# Patient Record
Sex: MALE | Race: OTHER | HISPANIC OR LATINO
[De-identification: names, ages, dates, MRNs, and addresses within clinical notes are randomized per-mention and may not be internally consistent; named-entity substitution may affect disease eponyms.]

---

## 2019-05-28 ENCOUNTER — APPOINTMENT (OUTPATIENT)
Dept: SPORTS MEDICINE | Facility: CLINIC | Age: 54
End: 2019-05-28

## 2020-12-18 ENCOUNTER — TRANSCRIPTION ENCOUNTER (OUTPATIENT)
Age: 55
End: 2020-12-18

## 2021-01-15 ENCOUNTER — APPOINTMENT (OUTPATIENT)
Dept: DISASTER EMERGENCY | Facility: CLINIC | Age: 56
End: 2021-01-15

## 2021-07-24 ENCOUNTER — TRANSCRIPTION ENCOUNTER (OUTPATIENT)
Age: 56
End: 2021-07-24

## 2021-08-10 ENCOUNTER — APPOINTMENT (OUTPATIENT)
Dept: UROLOGY | Facility: CLINIC | Age: 56
End: 2021-08-10
Payer: MEDICAID

## 2021-08-10 ENCOUNTER — APPOINTMENT (OUTPATIENT)
Dept: UROLOGY | Facility: CLINIC | Age: 56
End: 2021-08-10

## 2021-08-10 VITALS
HEART RATE: 76 BPM | BODY MASS INDEX: 28.19 KG/M2 | DIASTOLIC BLOOD PRESSURE: 74 MMHG | SYSTOLIC BLOOD PRESSURE: 112 MMHG | OXYGEN SATURATION: 96 % | WEIGHT: 186 LBS | HEIGHT: 68 IN | RESPIRATION RATE: 18 BRPM

## 2021-08-10 DIAGNOSIS — E78.00 PURE HYPERCHOLESTEROLEMIA, UNSPECIFIED: ICD-10-CM

## 2021-08-10 DIAGNOSIS — Z01.818 ENCOUNTER FOR OTHER PREPROCEDURAL EXAMINATION: ICD-10-CM

## 2021-08-10 DIAGNOSIS — R53.83 OTHER FATIGUE: ICD-10-CM

## 2021-08-10 PROCEDURE — 99204 OFFICE O/P NEW MOD 45 MIN: CPT

## 2021-08-10 PROCEDURE — 36415 COLL VENOUS BLD VENIPUNCTURE: CPT

## 2021-08-10 NOTE — PHYSICAL EXAM
[Normal Appearance] : normal appearance [Edema] : no peripheral edema [Respiration, Rhythm And Depth] : normal respiratory rhythm and effort [Normal Station and Gait] : the gait and station were normal for the patient's age [Skin Color & Pigmentation] : normal skin color and pigmentation [Sensation] : the sensory exam was normal to light touch and pinprick [Oriented To Time, Place, And Person] : oriented to person, place, and time [Inguinal Lymph Nodes Enlarged Bilaterally] : inguinal [Urethral Meatus] : meatus normal [Penis Abnormality] : normal uncircumcised penis [Urinary Bladder Findings] : the bladder was normal on palpation [Epididymis] : the epididymides were normal [FreeTextEntry1] : bilateral grade II variocele; L testicular atrophy (L testicle smaller than R testicle)

## 2021-08-10 NOTE — HISTORY OF PRESENT ILLNESS
[FreeTextEntry1] : ARIK CANCHOLA, a 55 year old male, presented to the office with the chief complaint of fatigue and low Testosterone.\par \par Patient went to PCP and bloods were drawn. He was told he low testosterone.\par \par Morning erections absent\par \par Stated 3 weeks ago.\par \par No urinary issues

## 2021-08-11 LAB
ALBUMIN SERPL ELPH-MCNC: 4.5 G/DL
ALP BLD-CCNC: 62 U/L
ALT SERPL-CCNC: 30 U/L
ANION GAP SERPL CALC-SCNC: 10 MMOL/L
AST SERPL-CCNC: 23 U/L
BASOPHILS # BLD AUTO: 0.03 K/UL
BASOPHILS NFR BLD AUTO: 0.4 %
BILIRUB SERPL-MCNC: 0.6 MG/DL
BUN SERPL-MCNC: 18 MG/DL
CALCIUM SERPL-MCNC: 9.5 MG/DL
CHLORIDE SERPL-SCNC: 105 MMOL/L
CHOLEST SERPL-MCNC: 122 MG/DL
CO2 SERPL-SCNC: 26 MMOL/L
CREAT SERPL-MCNC: 0.89 MG/DL
EOSINOPHIL # BLD AUTO: 0.1 K/UL
EOSINOPHIL NFR BLD AUTO: 1.2 %
ESTIMATED AVERAGE GLUCOSE: 111 MG/DL
ESTRADIOL SERPL-MCNC: 11 PG/ML
FSH SERPL-MCNC: 4.2 IU/L
GLUCOSE SERPL-MCNC: 108 MG/DL
HBA1C MFR BLD HPLC: 5.5 %
HCT VFR BLD CALC: 45.1 %
HDLC SERPL-MCNC: 37 MG/DL
HGB BLD-MCNC: 15.7 G/DL
IMM GRANULOCYTES NFR BLD AUTO: 0.7 %
LDLC SERPL CALC-MCNC: 59 MG/DL
LH SERPL-ACNC: 3.4 IU/L
LYMPHOCYTES # BLD AUTO: 2.91 K/UL
LYMPHOCYTES NFR BLD AUTO: 34.9 %
MAN DIFF?: NORMAL
MCHC RBC-ENTMCNC: 31.1 PG
MCHC RBC-ENTMCNC: 34.8 GM/DL
MCV RBC AUTO: 89.3 FL
MONOCYTES # BLD AUTO: 0.43 K/UL
MONOCYTES NFR BLD AUTO: 5.2 %
NEUTROPHILS # BLD AUTO: 4.81 K/UL
NEUTROPHILS NFR BLD AUTO: 57.6 %
NONHDLC SERPL-MCNC: 85 MG/DL
PLATELET # BLD AUTO: 154 K/UL
POTASSIUM SERPL-SCNC: 4 MMOL/L
PROLACTIN SERPL-MCNC: 8.6 NG/ML
PROT SERPL-MCNC: 6.5 G/DL
PSA FREE FLD-MCNC: 39 %
PSA FREE SERPL-MCNC: 0.23 NG/ML
PSA SERPL-MCNC: 0.6 NG/ML
RBC # BLD: 5.05 M/UL
RBC # FLD: 13.2 %
SODIUM SERPL-SCNC: 141 MMOL/L
TRIGL SERPL-MCNC: 129 MG/DL
TSH SERPL-ACNC: 0.89 UIU/ML
WBC # FLD AUTO: 8.34 K/UL

## 2021-08-12 ENCOUNTER — APPOINTMENT (OUTPATIENT)
Dept: UROLOGY | Facility: CLINIC | Age: 56
End: 2021-08-12
Payer: MEDICAID

## 2021-08-12 DIAGNOSIS — R79.89 OTHER SPECIFIED ABNORMAL FINDINGS OF BLOOD CHEMISTRY: ICD-10-CM

## 2021-08-12 PROCEDURE — 99213 OFFICE O/P EST LOW 20 MIN: CPT

## 2021-08-12 RX ORDER — DICLOFENAC SODIUM 1% 10 MG/G
1 GEL TOPICAL
Qty: 200 | Refills: 0 | Status: DISCONTINUED | COMMUNITY
Start: 2021-07-22 | End: 2021-08-12

## 2021-08-12 RX ORDER — ROSUVASTATIN CALCIUM 5 MG/1
5 TABLET, FILM COATED ORAL DAILY
Qty: 30 | Refills: 0 | Status: DISCONTINUED | COMMUNITY
Start: 2021-08-10 | End: 2021-08-12

## 2021-08-12 RX ORDER — FLUTICASONE PROPIONATE 50 UG/1
50 SPRAY, METERED NASAL
Qty: 16 | Refills: 0 | Status: DISCONTINUED | COMMUNITY
Start: 2021-08-06 | End: 2021-08-12

## 2021-08-12 RX ORDER — CETIRIZINE HYDROCHLORIDE 10 MG/1
10 TABLET, COATED ORAL
Qty: 30 | Refills: 0 | Status: DISCONTINUED | COMMUNITY
Start: 2021-08-06 | End: 2021-08-12

## 2021-08-12 RX ORDER — MONTELUKAST 10 MG/1
10 TABLET, FILM COATED ORAL
Qty: 60 | Refills: 0 | Status: DISCONTINUED | COMMUNITY
Start: 2021-08-06 | End: 2021-08-12

## 2021-08-12 NOTE — ASSESSMENT
[FreeTextEntry1] : Resent script for Cialis to [facility] in [place]\par \par Advised patient still waiting for FRET from 8/10/21 draw.\par \par Will see him in 2-3 weeks. \par \par The submitted E/M billing level for this visit reflects the total time spent on the day of the visit including face-to-face time spent with the patient, non-face-to-face review of medical records and relevant information, documentation, and asynchronous communication with the patient after a visit via phone, email, or patient’s eHR portal after the visit.  \par \par The medical records reviewed are either scanned into the chart or reviewed with the patient using a patient’s electronic medical records portal for patients with records not available to [facility] via electronic transmission platforms from other institutions and labs. \par \par Time spend counseling and performing coordination of care was also included in determining the appropriate EM billing level.\par \par I have reviewed and verified information regarding the chief complaint and history recorded by the ancillary staff and/or the patient. I have independently reviewed and interpreted tests performed by other physicians and facilities as necessary. \par \par I have discussed with the patient differential diagnosis, reason for auxiliary tests if ordered, risks, benefits, alternatives, and complications of each form of therapy were discussed. \par \par \par I saw and evaluated the patient with nurse practitioner Jacob Guerrier. \par I have confirmed the chief complaint, past medical, surgical, and social history.\par I have examined the patient. \par I have reviewed the note and interpreted auxiliary tests results. \par I have formulated the assessment and plan and discussed my recommendations of care to the nurse practitioner.\par I agree with the findings and the plan of care as documented by this  note which I edited as necessary.\par

## 2021-08-13 LAB
TESTOST BND SERPL-MCNC: 4.7 PG/ML
TESTOSTERONE TOTAL S: 280 NG/DL

## 2021-11-01 ENCOUNTER — APPOINTMENT (OUTPATIENT)
Dept: UROLOGY | Facility: CLINIC | Age: 56
End: 2021-11-01
Payer: COMMERCIAL

## 2021-11-01 VITALS
DIASTOLIC BLOOD PRESSURE: 79 MMHG | RESPIRATION RATE: 16 BRPM | OXYGEN SATURATION: 94 % | SYSTOLIC BLOOD PRESSURE: 120 MMHG | HEART RATE: 80 BPM

## 2021-11-01 DIAGNOSIS — N50.0 ATROPHY OF TESTIS: ICD-10-CM

## 2021-11-01 DIAGNOSIS — I86.1 SCROTAL VARICES: ICD-10-CM

## 2021-11-01 DIAGNOSIS — E34.9 ENDOCRINE DISORDER, UNSPECIFIED: ICD-10-CM

## 2021-11-01 PROCEDURE — 99213 OFFICE O/P EST LOW 20 MIN: CPT

## 2021-11-01 RX ORDER — TESTOSTERONE 20.25 MG/1.25G
20.25 MG/1.25GM GEL TOPICAL
Qty: 2 | Refills: 0 | Status: DISCONTINUED | COMMUNITY
Start: 2021-08-13 | End: 2021-11-01

## 2021-11-01 NOTE — HISTORY OF PRESENT ILLNESS
[FreeTextEntry1] : Here for follow up of ED and low T\par \par his T total is low normal \par the free T is low \par \par Insurance didn't’t approve TT\par \par will try different formulation \par \par discussed varicocele repair - he wants to wait with it and see how much he will improve with TRT brought second T level \par \par \par \par \par follow up in 3 months\par \par \par Low testosterone.  Low testosterone can be caused by primary testicular failure or be due to secondary problems with pituitary or hypothalamic function.  Primary testicular failure typically is due to varicocele, orchitis, Klinefelter syndrome, Sertoli cell dysfunction, alcohol abuse, heavy metal chronic exposure, obesity due to elevated temperature in the scrotum, and others.  Typically in situations of primary testicular failure the LH and FSH are elevated and testosterone is low or low normal.  With secondary testicular failure typically LH and FSH are low or low normal sometimes not detectable.  Secondary testicular dysfunction can be due to pituitary adenomas, brain tumors, post radiation therapy to the brain, exogenous testosterone injection and abuse or production, trauma to the brain, Kallmann syndrome and other problems.\par \par Low testosterone can be associated with erectile dysfunction, low sex drive, loss of morning erections, fatigue, increased fat body mass, decrease in bone mineral density.\par \par Treatment of low testosterone focuses on correction of identifiable causes if possible otherwise testosterone replacement therapy can be initiated.  Testosterone replacement therapy can be used using topical agents or injectable agents.  Oral agents have been also available in [place] but experience with them is relatively limited.\par \par Topical testosterone replacement preparation allow for flexible dosing and achieve physiological levels of testosterone.  They are well-tolerated and have low risk of elevated hematocrit.\par \par They also have low risk of suppression of FSH and LH which can lead to secondary testicular atrophy.\par \par Injectable testosterone preparation suppressed natural testosterone production via suppression of LH and FSH.  They also have issues with very high levels after injection and then low levels before next injection.  There is higher risk of hematocrit increase with injectable testosterone preparations.  We typically start patient with topical testosterone preparation.  Testopel which is a pellet insertable under the skin every 3 to 4 months can also be used in patients who have stable symptoms and improved symptoms after testosterone replacement therapy.\par \par Testosterone pellets are inserted in the office.\par \par The risk of testosterone replacement therapy specifically possible increased risk of cardiovascular events like heart attack, strokes, deep vein thrombosis were discussed with the patient.  There is also literature indicating that low testosterone is associated with high risk of cardiovascular events.\par \par Different forms of therapy, risks, benefits and alternatives to therapy were discussed.  Need for adherence to the follow-up, periodic lab checks especially testosterone level, PSA, hematocrit and occasional liver function tests were discussed with the patient.  All questions were answered.\par

## 2021-11-02 ENCOUNTER — NON-APPOINTMENT (OUTPATIENT)
Age: 56
End: 2021-11-02

## 2021-11-04 ENCOUNTER — NON-APPOINTMENT (OUTPATIENT)
Age: 56
End: 2021-11-04

## 2021-11-08 ENCOUNTER — NON-APPOINTMENT (OUTPATIENT)
Age: 56
End: 2021-11-08

## 2021-11-11 LAB
TESTOST BND SERPL-MCNC: 11.5 PG/ML
TESTOSTERONE TOTAL S: 592 NG/DL

## 2022-01-31 ENCOUNTER — APPOINTMENT (OUTPATIENT)
Dept: UROLOGY | Facility: CLINIC | Age: 57
End: 2022-01-31

## 2022-05-25 ENCOUNTER — APPOINTMENT (OUTPATIENT)
Dept: ORTHOPEDIC SURGERY | Facility: CLINIC | Age: 57
End: 2022-05-25
Payer: OTHER MISCELLANEOUS

## 2022-05-25 PROCEDURE — 73565 X-RAY EXAM OF KNEES: CPT

## 2022-05-25 PROCEDURE — 99214 OFFICE O/P EST MOD 30 MIN: CPT | Mod: 25

## 2022-05-25 PROCEDURE — 20610 DRAIN/INJ JOINT/BURSA W/O US: CPT | Mod: RT

## 2022-05-25 PROCEDURE — 73560 X-RAY EXAM OF KNEE 1 OR 2: CPT | Mod: RT

## 2022-05-25 PROCEDURE — 99072 ADDL SUPL MATRL&STAF TM PHE: CPT

## 2022-05-25 RX ORDER — SODIUM PICOSULFATE, MAGNESIUM OXIDE, AND ANHYDROUS CITRIC ACID 10; 3.5; 12 MG/160ML; G/160ML; G/160ML
10-3.5-12 MG-GM LIQUID ORAL
Qty: 320 | Refills: 0 | Status: COMPLETED | COMMUNITY
Start: 2022-05-12

## 2022-05-25 RX ORDER — CYCLOBENZAPRINE HYDROCHLORIDE 10 MG/1
10 TABLET, FILM COATED ORAL
Qty: 30 | Refills: 0 | Status: COMPLETED | COMMUNITY
Start: 2021-08-10 | End: 2022-05-25

## 2022-05-25 NOTE — HISTORY OF PRESENT ILLNESS
[de-identified] :  2/8/19\par \par Follow up today. Symptoms continue. States right knee pain has been worse recently. No new injury. Worse with twisting or stairs. Feels unstable. Has had relief with CSI in the past. Full duty.

## 2022-05-25 NOTE — PROCEDURE
[FreeTextEntry3] : Large Joint Injection was performed to right knee because of pain and inflammation. \par Decadron: An injection of Decadron 4 mg , \par Kenalog: An injection of Kenalog 5 mg , \par Lidocaine: 2 cc. \par \par Patient has tried OTC's including aspirin, Ibuprofen, Aleve etc or prescription NSAIDS, and/or exercises at home and/ or physical therapy without satisfactory response and Patient has decreased mobility in the joint. The risks, benefits, and alternatives to cortisone injection were explained in full to the patient. Risks outlined include but are not limited to infection, sepsis, bleeding, scarring, skin discoloration, temporary increase in pain, syncopal episode, failure to resolve symptoms, allergic reaction, symptom recurrence, and elevation of blood sugar in diabetics. Patient understood the risks. All questions were answered. After discussion of options, patient requested an injection. Oral informed consent was obtained. Sterile technique was utilized for the procedure including the preparation of the solutions used for the injection. Injection site was prepped with betadine and alcohol. Ethyl chloride sprayed topically. Sterile technique used. Patient tolerated procedure well. \par \par Post Procedure Instructions: Patient was advised to call if redness, pain, or fever occur. Apply ice for 15 min. every 2 hours for the next 12-24 hours as tolerated. Patient was advised to rest the joint(s) for 1-2 days.\par \par

## 2022-05-25 NOTE — PHYSICAL EXAM
[5___] : hamstring 5[unfilled]/5 [NL (140)] : flexion 140 degrees [Right] : right knee [Lateral] : lateral [[place]] : skyline [Isolated lateral compartmental OA] : Isolated lateral compartmental OA [AP Standing] : anteroposterior standing [Mild tricompartmental OA lateral narrowing] : Mild tricompartmental OA lateral narrowing [] : no erythema [FreeTextEntry9] : note lateral joint space narrowing both knees, moderate r and mild left. Note r knee increased lateral joint narrowing since 2019  [TWNoteComboBox7] : False [de-identified] : extension -5 degrees

## 2022-05-25 NOTE — WORK
[Partial] : partial [Unknown at this time] : : unknown at this time [Patient] : patient [FreeTextEntry1] : fair.\par The patient is currently working without limitations

## 2022-05-25 NOTE — DISCUSSION/SUMMARY
[de-identified] : The risks, benefits, and alternatives to corticosteroid injection were reviewed with the patient. Risks outlined include, but are not limited to infection, sepsis, bleeding, scarring, skin discoloration, temporary increase in pain, syncopal episode, failure to resolve symptoms, symptoms recurrence, allergic reaction, flare reaction, and elevation of blood sugar in diabetics. Patient understood the risks and asked to proceed with this treatment course.\par \par Progress note completed by FILEMON [place].\par \par Physician Instructions:\par The documentation recorded by the PA accurately reflects the service I personally performed and decisions made by me.\par OA knee discussed and may need to consider viscosupplementation. due to persistant knee pain, PT would help and may need a new mri to r/o any other pathology\par

## 2022-06-10 ENCOUNTER — APPOINTMENT (OUTPATIENT)
Dept: ORTHOPEDIC SURGERY | Facility: CLINIC | Age: 57
End: 2022-06-10
Payer: OTHER MISCELLANEOUS

## 2022-06-10 VITALS — BODY MASS INDEX: 28.19 KG/M2 | HEIGHT: 68 IN | WEIGHT: 186 LBS

## 2022-06-10 PROCEDURE — 99214 OFFICE O/P EST MOD 30 MIN: CPT

## 2022-06-10 PROCEDURE — 99072 ADDL SUPL MATRL&STAF TM PHE: CPT

## 2022-06-10 RX ORDER — GABAPENTIN 100 MG/1
100 CAPSULE ORAL AT BEDTIME
Qty: 60 | Refills: 1 | Status: ACTIVE | COMMUNITY
Start: 2022-06-10 | End: 1900-01-01

## 2022-06-10 NOTE — HISTORY OF PRESENT ILLNESS
[8] : 8 [7] : 7 [Dull/Aching] : dull/aching [Not working due to injury] : Work status: not working due to injury [de-identified] : Patient Complaint - WC 2/8/19 Fell on the stairs. \par Pain is across the lower back and radiates down the posterior left thigh to the knee described as a sharp pain with\par associated tingling. Pain is worse with standing and bending. Has done PT in the past but now the insurance denies it\par No prior LBP or sciatic pain.\par \par L MRI 5/11/2019:\par Findings: Alignment appears anatomic. Vertebral body heights are maintained. There is multilevel disc desiccation with\par degenerative endplate changes, left greater than right at L5-S1. There is no acute fracture. The conus appears\par unremarkable and there are no gross paravertebral soft tissue masses.\par T12-L1: There is a small left paracentral protrusion, disc bulging, and bony ridging asymmetric towards the left.\par L1-L2: There is a left paracentral protrusion with mild disc bulging and bony ridging asymmetric towards the left.\par L2-L3: There is no posterior disc herniation.\par L3-L4: There is mild diffuse disc bulging.\par L4-L5: There is mild-to-moderate disc bulging and mild facet hypertrophy with mild bilateral foraminal narrowing and a\par small posterior central subligamentous protrusion effacing the thecal sac centrally.\par L5-S1: There is disc bulging, bony ridging, facet hypertrophy, and moderate left foraminal narrowing.\par L MRI 12/4/21- \par Findings: Alignment remains anatomic. Vertebral body heights are maintained. There is no acute vertebral body fracture.\par The conus appears unremarkable. There are no gross paravertebral soft tissue masses. There are findings suggesting a\par small cyst in the mid left kidney measuring approximately 1.5 cm, incompletely evaluated on the current exam. There is\par no gross retroperitoneal adenopathy. There are mild degenerative changes in the\par lower thoracic spine and a small left paracentral protrusion at T12-L1.\par L1-L2: There is a small left paracentral protrusion with mild disc bulging and bony ridging asymmetric towards the left.\par L2-L3: There is no posterior disc herniation.\par L3-L4: There is mild diffuse disc bulging.\par L4-L5: There is diffuse disc bulging, facet arthrosis, mild bilateral foraminal narrowing and a small posterior central\par protrusion effacing the thecal sac.\par L5-S1: There is disc bulging, bony ridging, facet arthrosis and moderate left foraminal narrowing.\par Ind. review- L4/5 bulge with R paracentral protrusion and LR narrowing\par \par _____________________________\par \par 7/13/21- Takes diclofenac and tizanidine which helps.\par 11/30/21- c/o LBP with pain radiating to the RLE buttock to posterior thigh. Of note also has separate right knee pain.\par Has underwent LESI with relief.\par 12/16/21- Continued LBP with pain to the RLE buttocks and posterior thigh. Is in PT with some relief\par 2/1/22- continued LBP with pain to the RLE buttocks and posterior thigh to posterolateral leg\par 6/10/22- Still LBP  with pain to the RLE buttocks and posterior thigh to posterolateral leg. Has had relief with gabapentin [] : Post Surgical Visit: no [FreeTextEntry1] : L-Spine  [de-identified] : none

## 2022-06-10 NOTE — ASSESSMENT
[FreeTextEntry1] : L4/5 bulge with R paracentral protrusion and LR narrowing.\par \par His complaints are RLE radicular complaints, he has had LBP with tightness on the L, including my initial evaluation. His\par complaints though have more consistently been on the right from the first documented meeting with my partner and\par during those visits with myself. \par His bulge at L4/5 on the right has increased since 2019\par PCP f/u for renal findings\par continue nancy\par \par Gabapentin- Patient advised of sedating effects, instructed not to drive, operate machinery, or take with other sedating medications. Advised of need to taper on/off medication and risk of abruptly stopping gabapentin.\par

## 2022-06-10 NOTE — IMAGING
[de-identified] : Back, including spine: Palpation of the thoracic/lumbar spine is as follows: left lumbar paraspinal\par tenderness. NO GT/ITB TTP Range of motion of the thoracic and lumbar spine is as follows: full range of motion\par with mild stiffness. Strength Testing of the thoracic and lumbar spine is as follows: motor exam is non-focal\par throughout both lower extremities Special testing of the thoracic and lumbar spine is as follows: negative sitting\par straight leg raise on right Neurological testing of the thoracic and lumbar spine is as follows: light touch is intact\par throughout both lower extremities \par General: \par The percentage of impairment is partial. The patients PMHx does not reveal pre-existing condition(s) that\par may affect treatment/prognosis. is working. The patients return to work/limitations will be discussed with\par with patient. No Rx restrictions. Board authorized health care provider. I provided the services listed above. \par FORMAL REQUEST AUTHORIZATION FOR Spine - Lumbar PT needs to continue help patient in their functional\par return as they have made improvement from this modality.

## 2022-06-12 ENCOUNTER — NON-APPOINTMENT (OUTPATIENT)
Age: 57
End: 2022-06-12

## 2022-06-13 ENCOUNTER — NON-APPOINTMENT (OUTPATIENT)
Age: 57
End: 2022-06-13

## 2022-07-20 ENCOUNTER — APPOINTMENT (OUTPATIENT)
Dept: ORTHOPEDIC SURGERY | Facility: CLINIC | Age: 57
End: 2022-07-20

## 2022-07-20 VITALS — WEIGHT: 186 LBS | BODY MASS INDEX: 28.19 KG/M2 | HEIGHT: 68 IN

## 2022-07-20 PROCEDURE — 99072 ADDL SUPL MATRL&STAF TM PHE: CPT

## 2022-07-20 PROCEDURE — 99213 OFFICE O/P EST LOW 20 MIN: CPT | Mod: PA

## 2022-07-20 RX ORDER — GABAPENTIN 100 MG/1
100 CAPSULE ORAL
Qty: 60 | Refills: 0 | Status: COMPLETED | COMMUNITY
Start: 2022-03-25 | End: 2022-07-20

## 2022-07-20 NOTE — REASON FOR VISIT
[FreeTextEntry2] : Patient is here for a Worker's Comp Follow Up appointment for te Right Knee. DOI: 2/8/19

## 2022-07-20 NOTE — HISTORY OF PRESENT ILLNESS
[8] : 8 [de-identified] : WC 2/8/19\par \par Follow up today. Symptoms continue, but does notes relief with CSI at the last visit. Notes increased tightness in the upper back following lifting at work yesterday. He saw Dr. Storey, who  recommended surgery but patient is hesitant regarding this. He is managing with medication. Full duty. [FreeTextEntry1] : right knee

## 2022-07-20 NOTE — DISCUSSION/SUMMARY
[de-identified] : I discussed timing and frequency of the medication with the patient.\par \par Continue follow up with Dr. Storey.

## 2022-07-20 NOTE — PHYSICAL EXAM
[NL (140)] : flexion 140 degrees [5___] : hamstring 5[unfilled]/5 [Right] : right knee [Lateral] : lateral [[place]] : skyline [Isolated lateral compartmental OA] : Isolated lateral compartmental OA [AP Standing] : anteroposterior standing [Mild tricompartmental OA lateral narrowing] : Mild tricompartmental OA lateral narrowing [] : no erythema [FreeTextEntry9] : note lateral joint space narrowing both knees, moderate r and mild left. Note r knee increased lateral joint narrowing since 2019  [de-identified] : extension -5 degrees

## 2022-08-29 ENCOUNTER — APPOINTMENT (OUTPATIENT)
Dept: UROLOGY | Facility: CLINIC | Age: 57
End: 2022-08-29

## 2022-08-31 ENCOUNTER — APPOINTMENT (OUTPATIENT)
Dept: ORTHOPEDIC SURGERY | Facility: CLINIC | Age: 57
End: 2022-08-31

## 2022-08-31 VITALS — BODY MASS INDEX: 28.19 KG/M2 | HEIGHT: 68 IN | WEIGHT: 186 LBS

## 2022-08-31 PROCEDURE — 99072 ADDL SUPL MATRL&STAF TM PHE: CPT

## 2022-08-31 PROCEDURE — 99214 OFFICE O/P EST MOD 30 MIN: CPT

## 2022-08-31 NOTE — PHYSICAL EXAM
[NL (140)] : flexion 140 degrees [5___] : hamstring 5[unfilled]/5 [Right] : right knee [Lateral] : lateral [[place]] : skyline [Isolated lateral compartmental OA] : Isolated lateral compartmental OA [AP Standing] : anteroposterior standing [Mild tricompartmental OA lateral narrowing] : Mild tricompartmental OA lateral narrowing [FreeTextEntry9] : note lateral joint space narrowing both knees, moderate r and mild left. Note r knee increased lateral joint narrowing since 2019  [] : no erythema [de-identified] : extension -5 degrees

## 2022-08-31 NOTE — HISTORY OF PRESENT ILLNESS
[Sharp] : sharp [Part time] : Work status: part time [8] : 8 [de-identified] : WC 2/8/19\par \par Follow up today. Symptoms continue, but does notes relief with CSI at the last visit. Notes increased tightness in the upper back following lifting at work yesterday. He saw Dr. Storey, who  recommended surgery but patient is hesitant regarding this. He is managing with medication.  Working light duty. does light activities.\par Back pain worse and continued pain r shoulder , r  knee and lumbar spine. [] : Post Surgical Visit: no [FreeTextEntry1] : right knee

## 2022-08-31 NOTE — DISCUSSION/SUMMARY
[de-identified] : I discussed timing and frequency of the medication with the patient.\par \par Continue follow up with Dr. Storey.\par \par Discussed back surgery with Dr Storey or to see pain management.\par \par Shoulder advised to see Dr Yun and consider surgery.\par \par Knee and torn lateral meniscus advised to see DR Avilez for arthoroscpy all explained.\par \par MRIs discussed knee r , meniscal tear and loose bodies.\par shoulder  labral tear  and back HNP. he will decide to have surgery or just stay at present level. of function..

## 2022-08-31 NOTE — REASON FOR VISIT
[FreeTextEntry2] :  WC  DOI 2/08 /19Patient is here for a Worker's Comp Follow Up appointment for te Right Knee, lumbar spine and r shoulder.

## 2022-09-26 ENCOUNTER — RX RENEWAL (OUTPATIENT)
Age: 57
End: 2022-09-26

## 2022-10-12 ENCOUNTER — APPOINTMENT (OUTPATIENT)
Dept: ORTHOPEDIC SURGERY | Facility: CLINIC | Age: 57
End: 2022-10-12

## 2022-10-26 ENCOUNTER — APPOINTMENT (OUTPATIENT)
Dept: ORTHOPEDIC SURGERY | Facility: CLINIC | Age: 57
End: 2022-10-26

## 2022-10-26 VITALS — WEIGHT: 186 LBS | HEIGHT: 68 IN | BODY MASS INDEX: 28.19 KG/M2

## 2022-10-26 PROCEDURE — 99213 OFFICE O/P EST LOW 20 MIN: CPT

## 2022-10-26 PROCEDURE — 99072 ADDL SUPL MATRL&STAF TM PHE: CPT

## 2022-10-26 RX ORDER — DICLOFENAC SODIUM 75 MG/1
75 TABLET, DELAYED RELEASE ORAL
Qty: 60 | Refills: 1 | Status: ACTIVE | COMMUNITY
Start: 2021-05-05

## 2022-10-26 NOTE — HISTORY OF PRESENT ILLNESS
[8] : 8 [de-identified] : WC 2/8/19\par \par Follow up today. Symptoms continue. [FreeTextEntry1] : right knee

## 2022-10-26 NOTE — PHYSICAL EXAM
[Right] : right knee [NL (140)] : flexion 140 degrees [5___] : hamstring 5[unfilled]/5 [TWNoteComboBox7] : forward flexion 60 degrees [de-identified] : left lateral bending 25 degrees [TWNoteComboBox6] : right lateral rotation 25 degrees [] : no erythema [de-identified] : extension -5 degrees

## 2022-10-26 NOTE — DISCUSSION/SUMMARY
[de-identified] : NSAIDS Discussed. Risks include stomach irritation including risks of bleeding and ulcers. With hypertension risk to raise blood pressure. Risks to liver and kidney all explained. Diabetics increased risk to kidneys. Explained that NSAIDS used long term must be followed by a physician and  At least twice a year Liver and kidney function blood tests must be performed\par \par Discussed back and knee and chronicity of both injuries. exercies instructed to limit his hours and not more tan 8 hour day. Pain chronic and nature. risks of meds explained and to have liver and kidney blood tests twice a year. risk of meds stressed

## 2022-10-26 NOTE — REASON FOR VISIT
[FreeTextEntry2] : Patient is here for a Worker's Comp Follow Up appointment for the Right Knee. DOI: 2/8/19

## 2022-11-30 ENCOUNTER — RX RENEWAL (OUTPATIENT)
Age: 57
End: 2022-11-30

## 2022-11-30 RX ORDER — TIZANIDINE 2 MG/1
2 TABLET ORAL EVERY 6 HOURS
Qty: 60 | Refills: 2 | Status: ACTIVE | COMMUNITY
Start: 2021-07-08 | End: 1900-01-01

## 2022-12-07 ENCOUNTER — APPOINTMENT (OUTPATIENT)
Dept: ORTHOPEDIC SURGERY | Facility: CLINIC | Age: 57
End: 2022-12-07

## 2022-12-07 VITALS — BODY MASS INDEX: 28.19 KG/M2 | WEIGHT: 186 LBS | HEIGHT: 68 IN

## 2022-12-07 DIAGNOSIS — M51.9 UNSPECIFIED THORACIC, THORACOLUMBAR AND LUMBOSACRAL INTERVERTEBRAL DISC DISORDER: ICD-10-CM

## 2022-12-07 PROCEDURE — 99214 OFFICE O/P EST MOD 30 MIN: CPT

## 2022-12-07 PROCEDURE — 99072 ADDL SUPL MATRL&STAF TM PHE: CPT

## 2022-12-07 NOTE — HISTORY OF PRESENT ILLNESS
[Dull/Aching] : dull/aching [Sharp] : sharp [8] : 8 [Part time] : Work status: part time [de-identified] : WC 2/8/19\par \par Follow up today. Symptoms continue, but does notes relief with CSI at the last visit. Notes increased tightness in the upper back following lifting at work yesterday. He saw Dr. Storey, who  recommended surgery but patient is hesitant regarding this. He is managing with medication.  Working light duty. does light activities.\par Back pain worse and continued pain r shoulder\par \par \par works 2 part time job in [employer] 40 hours total and 20 hours morning and 2 hour break and then again works 20 hours afternoon\par Total ot 35 to 40 hours a week. Not going to PT due to lack of approval. . [] : Post Surgical Visit: no [FreeTextEntry1] : right knee

## 2022-12-07 NOTE — DISCUSSION/SUMMARY
[de-identified] : I discussed timing and frequency of the medication with the patient.\par \par Continue follow up with Dr. Storey.\par Limited light work and to consider surgical options on knee lumbar spine and r shoulder\par \par Discussed back surgery with Dr Storey or to see pain management.\par \par Shoulder advised to see Dr Yun and consider surgery.\par \par Knee and torn lateral meniscus advised to see DR Avilez for arthoroscpy all explained.\par \par MRIs discussed knee r , meniscal tear and loose bodies.\par shoulder  labral tear  and back HNP. he will decide to have surgery or just stay at present level. of function..\par \par Advised surgery on r knee. Back to think about back surgery , all explained\par \par Request auth for orthovisc r [id number] last done in March 2021

## 2022-12-07 NOTE — PHYSICAL EXAM
[Right] : right knee [NL (140)] : flexion 140 degrees [5___] : hamstring 5[unfilled]/5 [] : no erythema [de-identified] : extension -5 degrees

## 2022-12-21 ENCOUNTER — NON-APPOINTMENT (OUTPATIENT)
Age: 57
End: 2022-12-21

## 2022-12-28 NOTE — ASSESSMENT
----- Message from Prashant Lozada RN sent at 12/21/2022  9:52 AM CST -----  Regarding: Home Monitor INR  Damon \"Eric\" will obtain his INR today 12/28/22 on his home monitor. This is a 1 week recheck.      [FreeTextEntry1] : Patient states PCP advised he was low on Testosterone.\par \par ED \par tired \par has bilateral varicocele and left testis atrophy \par will discuss possible repair in 2-3 weeks\par \par start cialis 5mg\par for ED \par \par once we have T level from his PCP and from today will decide on best treatment option \par \par \par \par \par The submitted E/M billing level for this visit reflects the total time spent on the day of the visit including face-to-face time spent with the patient, non-face-to-face review of medical records and relevant information, documentation, and asynchronous communication with the patient after a visit via phone, email, or patient’s eHR portal after the visit.  \par \par The medical records reviewed are either scanned into the chart or reviewed with the patient using a patient’s electronic medical records portal for patients with records not available to [facility] via electronic transmission platforms from other institutions and labs. \par \par Time spend counseling and performing coordination of care was also included in determining the appropriate EM billing level.\par \par I have reviewed and verified information regarding the chief complaint and history recorded by the ancillary staff and/or the patient. I have independently reviewed and interpreted tests performed by other physicians and facilities as necessary. \par \par I have discussed with the patient differential diagnosis, reason for auxiliary tests if ordered, risks, benefits, alternatives, and complications of each form of therapy were discussed.

## 2023-01-03 ENCOUNTER — APPOINTMENT (OUTPATIENT)
Dept: ORTHOPEDIC SURGERY | Facility: CLINIC | Age: 58
End: 2023-01-03
Payer: OTHER MISCELLANEOUS

## 2023-01-12 ENCOUNTER — APPOINTMENT (OUTPATIENT)
Dept: ORTHOPEDIC SURGERY | Facility: CLINIC | Age: 58
End: 2023-01-12
Payer: OTHER MISCELLANEOUS

## 2023-01-12 VITALS — WEIGHT: 186 LBS | BODY MASS INDEX: 28.19 KG/M2 | HEIGHT: 68 IN

## 2023-01-12 DIAGNOSIS — S83.281D OTHER TEAR OF LATERAL MENISCUS, CURRENT INJURY, RIGHT KNEE, SUBSEQUENT ENCOUNTER: ICD-10-CM

## 2023-01-12 PROCEDURE — 99214 OFFICE O/P EST MOD 30 MIN: CPT

## 2023-01-12 PROCEDURE — 99072 ADDL SUPL MATRL&STAF TM PHE: CPT

## 2023-01-12 RX ORDER — TIZANIDINE HYDROCHLORIDE 4 MG/1
4 CAPSULE ORAL
Qty: 40 | Refills: 0 | Status: ACTIVE | COMMUNITY
Start: 2023-01-12 | End: 1900-01-01

## 2023-01-12 RX ORDER — DICLOFENAC SODIUM 75 MG/1
75 TABLET, DELAYED RELEASE ORAL
Qty: 60 | Refills: 0 | Status: ACTIVE | COMMUNITY
Start: 2023-01-12 | End: 1900-01-01

## 2023-01-12 NOTE — DISCUSSION/SUMMARY
[de-identified] : Patient allowed to gently start resuming activities.\par Discussed change to medication prescription and usage. \par Offered cortisone steroid injection. \par Bracing options discussed with patient. \par Hyaluronic Acid inj pamphlet given to pt.\par poss arthroscopy in future\par request comp auth for orthovisc R knee

## 2023-01-12 NOTE — HISTORY OF PRESENT ILLNESS
[7] : 7 [6] : 6 [Dull/Aching] : dull/aching [Radiating] : radiating [Constant] : constant [Leisure] : leisure [Meds] : meds [de-identified] : has lateral pain, occasional swelling, has known LMT ad degne changes and has seen Garroway in the past, worse when more active [] : Post Surgical Visit: no [FreeTextEntry1] : Right knee [FreeTextEntry3] : 2/8/19 [FreeTextEntry5] : Patient was injured at work [FreeTextEntry7] : calf/shin [de-identified] :  [de-identified] : XR

## 2023-01-12 NOTE — PHYSICAL EXAM
[Right] : right knee [NL (140)] : flexion 140 degrees [5___] : hamstring 5[unfilled]/5 [Equivocal] : equivocal Clay [] : mildly antalgic [de-identified] : extension -5 degrees

## 2023-01-12 NOTE — WORK
[Partial] : partial [Unknown at this time] : : unknown at this time [Patient] : patient [Can return to work without limitations on ______] : can return to work without limitations on [unfilled] [FreeTextEntry1] : fair.\par The patient is currently working without limitations 2 part time jobs

## 2023-02-07 RX ORDER — CYCLOBENZAPRINE HYDROCHLORIDE 5 MG/1
5 TABLET, FILM COATED ORAL 3 TIMES DAILY
Qty: 30 | Refills: 0 | Status: ACTIVE | COMMUNITY
Start: 2023-02-07 | End: 1900-01-01

## 2023-02-22 ENCOUNTER — APPOINTMENT (OUTPATIENT)
Dept: ORTHOPEDIC SURGERY | Facility: CLINIC | Age: 58
End: 2023-02-22
Payer: OTHER MISCELLANEOUS

## 2023-02-22 VITALS — HEIGHT: 68 IN | WEIGHT: 186 LBS | BODY MASS INDEX: 28.19 KG/M2

## 2023-02-22 PROCEDURE — 20611 DRAIN/INJ JOINT/BURSA W/US: CPT

## 2023-02-22 PROCEDURE — 99072 ADDL SUPL MATRL&STAF TM PHE: CPT

## 2023-02-22 PROCEDURE — 99213 OFFICE O/P EST LOW 20 MIN: CPT | Mod: 25

## 2023-02-22 NOTE — HISTORY OF PRESENT ILLNESS
[7] : 7 [6] : 6 [Dull/Aching] : dull/aching [Radiating] : radiating [Leisure] : leisure [Meds] : meds [Part time] : Work status: part time [de-identified] : has lateral pain, occasional swelling, has known LMT ad degne changes and has seen Garroway in the past, worse when more active [Constant] : constant [] : Post Surgical Visit: no [FreeTextEntry1] : Right knee [FreeTextEntry3] : 2/8/19 [FreeTextEntry5] : Patient was injured at work [FreeTextEntry7] : calf/shin [de-identified] :  [de-identified] : XR

## 2023-02-22 NOTE — DISCUSSION/SUMMARY
[de-identified] : Patient allowed to gently start resuming activities.\par Discussed change to medication prescription and usage. \par Offered cortisone steroid injection. \par Bracing options discussed with patient. \par Hyaluronic Acid inj pamphlet given to pt.\par poss arthroscopy in future\par

## 2023-02-22 NOTE — PHYSICAL EXAM
[Right] : right knee [NL (140)] : flexion 140 degrees [5___] : hamstring 5[unfilled]/5 [Equivocal] : equivocal Clay [] : negative Lachmann [de-identified] : extension -5 degrees

## 2023-02-22 NOTE — WORK
[Can return to work without limitations on ______] : can return to work without limitations on [unfilled] [Unknown at this time] : : unknown at this time [Patient] : patient [Mild Partial] : mild partial [FreeTextEntry1] : fair.\par The patient is currently working without limitations 2 part time jobs

## 2023-02-22 NOTE — PROCEDURE
[FreeTextEntry3] : Euflexxa (Large Joint) with Ultrasound Guidance\par Viscosupplementation Injection: X-ray evidence of Osteoarthritis on this or prior visit and Patient has tried OTC's including aspirin, Ibuprofen, Aleve etc or prescription NSAIDS, and/or exercises at home and/ or physical therapy without satisfactory response. \par An injection of Euflexxa 2ml #__1__ was injected into the right knee(s). after verbal consent using sterile technique. The risks, benefits, and alternatives to Viscosupplementation injection were explained in full to the patient. Risks outlined include but are not limited to infection, sepsis, bleeding, scarring, skin discoloration, temporary increase in pain, syncopal episode, failure to resolve symptoms, allergic reaction, and symptom recurrence. Signs and symptoms of infection reviewed and patient advised to call immediately for redness, fevers, and/or chills. Patient understood the risks. All questions were answered. After discussion of options, patient requested Viscosupplementation. Oral informed consent was obtained and sterile prep was done of the injection site. Sterile technique was used without complications. The patient tolerated the procedure well. Ice tonight to the injection site. \par \par Ultrasound Guidance was used for the following reasons: altered anatomic landmarks because of erosive arthritis. \par \par Ultrasound guided injection was performed of the knee, visualization of the needle and placement of injection was performed without complication.\par

## 2023-03-01 ENCOUNTER — APPOINTMENT (OUTPATIENT)
Dept: ORTHOPEDIC SURGERY | Facility: CLINIC | Age: 58
End: 2023-03-01
Payer: OTHER MISCELLANEOUS

## 2023-03-01 PROCEDURE — 99072 ADDL SUPL MATRL&STAF TM PHE: CPT

## 2023-03-01 PROCEDURE — 20611 DRAIN/INJ JOINT/BURSA W/US: CPT

## 2023-03-01 PROCEDURE — 99213 OFFICE O/P EST LOW 20 MIN: CPT | Mod: 25

## 2023-03-01 NOTE — HISTORY OF PRESENT ILLNESS
[Part time] : Work status: part time [2] : 2 [Euflexxa] : Euflexxa [de-identified] : 3/1/23- for continued euflexxa #2 right knee\par \par has lateral pain, occasional swelling, has known LMT ad degne changes and has seen Garroway in the past, worse when more active [] : no [de-identified] : 02/022/2023 [de-identified] : right knee

## 2023-03-01 NOTE — PHYSICAL EXAM
[Right] : right knee [NL (140)] : flexion 140 degrees [5___] : hamstring 5[unfilled]/5 [Equivocal] : equivocal Clay [] : negative Lachmann [de-identified] : extension -5 degrees

## 2023-03-01 NOTE — WORK
[Mild Partial] : mild partial [Can return to work without limitations on ______] : can return to work without limitations on [unfilled] [Unknown at this time] : : unknown at this time [Patient] : patient [FreeTextEntry1] : fair.\par The patient is currently working without limitations 2 part time jobs

## 2023-03-01 NOTE — PROCEDURE
[FreeTextEntry3] : Euflexxa (Large Joint) with Ultrasound Guidance\par Viscosupplementation Injection: X-ray evidence of Osteoarthritis on this or prior visit and Patient has tried OTC's including aspirin, Ibuprofen, Aleve etc or prescription NSAIDS, and/or exercises at home and/ or physical therapy without satisfactory response. \par An injection of Euflexxa 2ml #__2__ was injected into the right knee(s). after verbal consent using sterile technique. The risks, benefits, and alternatives to Viscosupplementation injection were explained in full to the patient. Risks outlined include but are not limited to infection, sepsis, bleeding, scarring, skin discoloration, temporary increase in pain, syncopal episode, failure to resolve symptoms, allergic reaction, and symptom recurrence. Signs and symptoms of infection reviewed and patient advised to call immediately for redness, fevers, and/or chills. Patient understood the risks. All questions were answered. After discussion of options, patient requested Viscosupplementation. Oral informed consent was obtained and sterile prep was done of the injection site. Sterile technique was used without complications. The patient tolerated the procedure well. Ice tonight to the injection site. \par \par Ultrasound Guidance was used for the following reasons: altered anatomic landmarks because of erosive arthritis. \par \par Ultrasound guided injection was performed of the knee, visualization of the needle and placement of injection was performed without complication.\par

## 2023-03-01 NOTE — DISCUSSION/SUMMARY
[de-identified] : Patient allowed to gently start resuming activities.\par Discussed change to medication prescription and usage. \par Offered cortisone steroid injection. \par Bracing options discussed with patient. \par \par poss arthroscopy in future\par

## 2023-03-15 ENCOUNTER — APPOINTMENT (OUTPATIENT)
Dept: ORTHOPEDIC SURGERY | Facility: CLINIC | Age: 58
End: 2023-03-15
Payer: OTHER MISCELLANEOUS

## 2023-03-15 VITALS — BODY MASS INDEX: 28.19 KG/M2 | WEIGHT: 186 LBS | HEIGHT: 68 IN

## 2023-03-15 DIAGNOSIS — M17.11 UNILATERAL PRIMARY OSTEOARTHRITIS, RIGHT KNEE: ICD-10-CM

## 2023-03-15 PROCEDURE — 99024 POSTOP FOLLOW-UP VISIT: CPT

## 2023-03-15 PROCEDURE — 20611 DRAIN/INJ JOINT/BURSA W/US: CPT

## 2023-03-15 NOTE — HISTORY OF PRESENT ILLNESS
[3] : 3 [Euflexxa] : Euflexxa [de-identified] : Returns for continued euflexxa #3 right knee\par \par has lateral pain, occasional swelling, has known LMT ad degne changes and has seen Garroway in the past, worse when more active [] : no [de-identified] : 03/01/2023 [de-identified] : right knee

## 2023-03-15 NOTE — PHYSICAL EXAM
[Right] : right knee [NL (140)] : flexion 140 degrees [5___] : hamstring 5[unfilled]/5 [Equivocal] : equivocal Clay [] : negative Lachmann [de-identified] : extension -5 degrees

## 2023-03-15 NOTE — DISCUSSION/SUMMARY
[de-identified] : poss arthroscopy in the future.\par \par Return in 4-6 weeks for re-evalaution.

## 2023-03-15 NOTE — PROCEDURE
[FreeTextEntry3] : Euflexxa (Large Joint) with Ultrasound Guidance\par Viscosupplementation Injection: X-ray evidence of Osteoarthritis on this or prior visit and Patient has tried OTC's including aspirin, Ibuprofen, Aleve etc or prescription NSAIDS, and/or exercises at home and/ or physical therapy without satisfactory response. \par An injection of Euflexxa 2ml #__3__ was injected into the right knee(s). after verbal consent using sterile technique. The risks, benefits, and alternatives to Viscosupplementation injection were explained in full to the patient. Risks outlined include but are not limited to infection, sepsis, bleeding, scarring, skin discoloration, temporary increase in pain, syncopal episode, failure to resolve symptoms, allergic reaction, and symptom recurrence. Signs and symptoms of infection reviewed and patient advised to call immediately for redness, fevers, and/or chills. Patient understood the risks. All questions were answered. After discussion of options, patient requested Viscosupplementation. Oral informed consent was obtained and sterile prep was done of the injection site. Sterile technique was used without complications. The patient tolerated the procedure well. Ice tonight to the injection site. \par \par Ultrasound Guidance was used for the following reasons: altered anatomic landmarks because of erosive arthritis. \par \par Ultrasound guided injection was performed of the knee, visualization of the needle and placement of injection was performed without complication.\par

## 2023-03-22 ENCOUNTER — APPOINTMENT (OUTPATIENT)
Dept: ORTHOPEDIC SURGERY | Facility: CLINIC | Age: 58
End: 2023-03-22
Payer: OTHER MISCELLANEOUS

## 2023-03-22 VITALS — WEIGHT: 186 LBS | BODY MASS INDEX: 28.19 KG/M2 | HEIGHT: 68 IN

## 2023-03-22 PROCEDURE — 73030 X-RAY EXAM OF SHOULDER: CPT | Mod: RT

## 2023-03-22 PROCEDURE — 99214 OFFICE O/P EST MOD 30 MIN: CPT

## 2023-03-22 RX ORDER — TIZANIDINE HYDROCHLORIDE 4 MG/1
4 CAPSULE ORAL
Qty: 40 | Refills: 0 | Status: ACTIVE | COMMUNITY
Start: 2023-03-22 | End: 1900-01-01

## 2023-03-22 NOTE — DISCUSSION/SUMMARY
[de-identified] : Patient allowed to gently start resuming activities. \par Discussed change to medication prescription and usage. \par Bracing options discussed with patient. \par Activity modification as needed\par Discussed poss future surgery, pt deciding\par offered csi he declined

## 2023-03-22 NOTE — WORK
[Moderate Partial] : moderate partial [Does not reveal pre-existing condition(s) that may affect treatment/prognosis] : does not reveal pre-existing condition(s) that may affect treatment/prognosis [Can return to work without limitations on ______] : can return to work without limitations on [unfilled] [Patient] : patient [No Rx restrictions] : No Rx restrictions. [I provided the services listed above] :  I provided the services listed above. [FreeTextEntry1] : fair

## 2023-03-22 NOTE — HISTORY OF PRESENT ILLNESS
[Work related] : work related [8] : 8 [0] : 0 [Sharp] : sharp [Intermittent] : intermittent [Leisure] : leisure [Sleep] : sleep [Meds] : meds [Lying in bed] : lying in bed [de-identified] : Patient has persistent symptoms in the right shoulder, persistent pain. He has seen and treated with Dr Mcgill, history of labral tear. He has had CSI in the past with some help, no radicular complaints or prior surgery.  [FreeTextEntry1] : right shoulder [FreeTextEntry3] : 2/8/19 [FreeTextEntry5] : ARIK is a 57 year old M who is here today for WC follow up on right shoulder. No improvement in pain since last visit.  [de-identified] : lifting heavy objects

## 2023-03-24 ENCOUNTER — APPOINTMENT (OUTPATIENT)
Dept: UROLOGY | Facility: CLINIC | Age: 58
End: 2023-03-24

## 2023-03-31 ENCOUNTER — APPOINTMENT (OUTPATIENT)
Dept: UROLOGY | Facility: CLINIC | Age: 58
End: 2023-03-31

## 2023-04-05 ENCOUNTER — APPOINTMENT (OUTPATIENT)
Dept: ORTHOPEDIC SURGERY | Facility: CLINIC | Age: 58
End: 2023-04-05
Payer: OTHER MISCELLANEOUS

## 2023-04-05 VITALS — BODY MASS INDEX: 28.19 KG/M2 | WEIGHT: 186 LBS | HEIGHT: 68 IN

## 2023-04-05 PROCEDURE — 99214 OFFICE O/P EST MOD 30 MIN: CPT

## 2023-04-05 RX ORDER — TIZANIDINE HYDROCHLORIDE 4 MG/1
4 CAPSULE ORAL
Qty: 40 | Refills: 0 | Status: ACTIVE | COMMUNITY
Start: 2023-04-05 | End: 1900-01-01

## 2023-04-05 NOTE — PHYSICAL EXAM
[Right] : right knee [NL (140)] : flexion 140 degrees [5___] : hamstring 5[unfilled]/5 [] : patient ambulates without assistive device [de-identified] : extension -5 degrees

## 2023-04-05 NOTE — WORK
[Partial] : partial [Can return to work without limitations on ______] : can return to work without limitations on [unfilled] [Unknown at this time] : : unknown at this time [Patient] : patient [FreeTextEntry1] : fair\par The patient is currently working without limitations

## 2023-04-05 NOTE — HISTORY OF PRESENT ILLNESS
[Work related] : work related [Sharp] : sharp [Intermittent] : intermittent [Leisure] : leisure [Sleep] : sleep [Meds] : meds [Lying in bed] : lying in bed [de-identified] : \par Follow up today. Symptoms continue, but does notes relief with CSI at the past, had HA in R knee 3/15 with some help. Notes some issues with the  upper back and sees Dr. Storey, who  recommended surgery but patient is hesitant regarding this. He is managing with medication.  Working light duty. does light activities.\par  [8] : 8 [Dull/Aching] : dull/aching [Part time] : Work status: part time [] : Post Surgical Visit: no [FreeTextEntry1] : right knee [FreeTextEntry3] : 2/8/19 [FreeTextEntry5] : ARIK is a 57 year old M who is here today for WC follow up on right shoulder. No improvement in pain since last visit.  [de-identified] : lifting heavy objects

## 2023-04-05 NOTE — DISCUSSION/SUMMARY
[de-identified] : Continue follow up with Dr. Storey.\par Limited light work and to consider surgical options on knee lumbar spine and r shoulder\par \par poss csi R shoulder or see Dr Yun will renew [facility]

## 2023-04-05 NOTE — REASON FOR VISIT
[FreeTextEntry2] :  WC  DOI 2/08/19 Follow Up appointment for te Right Knee, lumbar spine and r shoulder.

## 2023-05-05 ENCOUNTER — APPOINTMENT (OUTPATIENT)
Dept: UROLOGY | Facility: CLINIC | Age: 58
End: 2023-05-05

## 2023-06-01 ENCOUNTER — APPOINTMENT (OUTPATIENT)
Dept: ORTHOPEDIC SURGERY | Facility: CLINIC | Age: 58
End: 2023-06-01
Payer: OTHER MISCELLANEOUS

## 2023-06-01 VITALS — BODY MASS INDEX: 28.19 KG/M2 | HEIGHT: 68 IN | WEIGHT: 186 LBS

## 2023-06-01 PROCEDURE — 99214 OFFICE O/P EST MOD 30 MIN: CPT

## 2023-06-01 RX ORDER — TIZANIDINE HYDROCHLORIDE 4 MG/1
4 CAPSULE ORAL
Qty: 60 | Refills: 0 | Status: ACTIVE | COMMUNITY
Start: 2023-06-01 | End: 1900-01-01

## 2023-06-01 NOTE — HISTORY OF PRESENT ILLNESS
[Work related] : work related [9] : 9 [Dull/Aching] : dull/aching [Sharp] : sharp [Meds] : meds [Lying in bed] : lying in bed [Part time] : Work status: part time [de-identified] : Pateint has persistent symptoms in the shoulder, knee and lower back. He has completed visco Mar 15 with the knee with mild help. Shoulder remains symptomatic, reaching over head, behind back. He is working. The meds do help [] : Post Surgical Visit: no [FreeTextEntry1] : right shoulder/knee [FreeTextEntry3] : 2/8/19 [FreeTextEntry5] : Here today for follow up right shoulder/right knee. Symptoms worse.  [de-identified] : lifting heavy objects [de-identified] : muscle relaxer

## 2023-06-01 NOTE — DISCUSSION/SUMMARY
[de-identified] : modify activities\par use elastic sleeve\par try OTC meds\par ice as needed\par

## 2023-06-01 NOTE — PHYSICAL EXAM
[Right] : right knee [NL (140)] : flexion 140 degrees [5___] : hamstring 5[unfilled]/5 [] : no erythema [TWNoteComboBox7] : flexion 120 degrees [de-identified] : extension -5 degrees

## 2023-06-01 NOTE — WORK
[Can return to work without limitations on ______] : can return to work without limitations on [unfilled] [Unknown at this time] : : unknown at this time [Patient] : patient [Mild Partial] : mild partial [Does not reveal pre-existing condition(s) that may affect treatment/prognosis] : does not reveal pre-existing condition(s) that may affect treatment/prognosis [No Rx restrictions] : No Rx restrictions. [I provided the services listed above] :  I provided the services listed above. [FreeTextEntry1] : poor\par The patient is currently working without limitations

## 2023-06-20 ENCOUNTER — RX RENEWAL (OUTPATIENT)
Age: 58
End: 2023-06-20

## 2023-07-11 ENCOUNTER — APPOINTMENT (OUTPATIENT)
Dept: ORTHOPEDIC SURGERY | Facility: CLINIC | Age: 58
End: 2023-07-11
Payer: OTHER MISCELLANEOUS

## 2023-07-11 DIAGNOSIS — M17.31 UNILATERAL POST-TRAUMATIC OSTEOARTHRITIS, RIGHT KNEE: ICD-10-CM

## 2023-07-11 PROCEDURE — 99213 OFFICE O/P EST LOW 20 MIN: CPT

## 2023-07-11 RX ORDER — TIZANIDINE 4 MG/1
4 TABLET ORAL 3 TIMES DAILY
Qty: 60 | Refills: 0 | Status: ACTIVE | COMMUNITY
Start: 2023-06-29 | End: 1900-01-01

## 2023-07-11 NOTE — WORK
[Mild Partial] : mild partial [Does not reveal pre-existing condition(s) that may affect treatment/prognosis] : does not reveal pre-existing condition(s) that may affect treatment/prognosis [Can return to work without limitations on ______] : can return to work without limitations on [unfilled] [Unknown at this time] : : unknown at this time [Patient] : patient [No Rx restrictions] : No Rx restrictions. [I provided the services listed above] :  I provided the services listed above. [FreeTextEntry1] : poor\par The patient is currently working without limitations

## 2023-07-11 NOTE — PHYSICAL EXAM
[Right] : right knee [NL (140)] : flexion 140 degrees [5___] : hamstring 5[unfilled]/5 [] : no erythema [TWNoteComboBox7] : flexion 120 degrees [de-identified] : extension -5 degrees

## 2023-07-11 NOTE — HISTORY OF PRESENT ILLNESS
[Work related] : work related [Sleep] : sleep [9] : 9 [Dull/Aching] : dull/aching [Sharp] : sharp [Meds] : meds [Lying in bed] : lying in bed [Part time] : Work status: part time [de-identified] : Pateint has persistent symptoms in the shoulder, knee and lower back. He has completed visco Mar 15 with the knee with mild help. Shoulder remains symptomatic, reaching over head, behind back. He is working. The meds do help; symptoms essentially unchanged.  [] : Post Surgical Visit: no [FreeTextEntry1] : right shoulder/knee [FreeTextEntry3] : 2/8/19 [FreeTextEntry5] : Here today for follow up right shoulder/right knee. Symptoms worse.  [de-identified] : lifting heavy objects [de-identified] : muscle relaxer

## 2023-07-11 NOTE — DISCUSSION/SUMMARY
[de-identified] : modify activities\par use elastic sleeve\par try OTC meds\par ice as needed\par tizanidine renewed\par

## 2023-08-28 ENCOUNTER — APPOINTMENT (OUTPATIENT)
Dept: ORTHOPEDIC SURGERY | Facility: CLINIC | Age: 58
End: 2023-08-28
Payer: OTHER MISCELLANEOUS

## 2023-08-28 VITALS — WEIGHT: 186 LBS | BODY MASS INDEX: 28.19 KG/M2 | HEIGHT: 68 IN

## 2023-08-28 DIAGNOSIS — M77.8 OTHER ENTHESOPATHIES, NOT ELSEWHERE CLASSIFIED: ICD-10-CM

## 2023-08-28 DIAGNOSIS — S43.431D SUPERIOR GLENOID LABRUM LESION OF RIGHT SHOULDER, SUBSEQUENT ENCOUNTER: ICD-10-CM

## 2023-08-28 DIAGNOSIS — M51.26 OTHER INTERVERTEBRAL DISC DISPLACEMENT, LUMBAR REGION: ICD-10-CM

## 2023-08-28 DIAGNOSIS — M54.16 RADICULOPATHY, LUMBAR REGION: ICD-10-CM

## 2023-08-28 DIAGNOSIS — S83.281D OTHER TEAR OF LATERAL MENISCUS, CURRENT INJURY, RIGHT KNEE, SUBSEQUENT ENCOUNTER: ICD-10-CM

## 2023-08-28 PROCEDURE — 99455 WORK RELATED DISABILITY EXAM: CPT

## 2023-08-28 NOTE — DISCUSSION/SUMMARY
[de-identified] : modify activities\par  use elastic sleeve\par  try OTC meds\par  ice as needed\par  tizanidine renewed\par

## 2023-08-28 NOTE — WORK
[Has the patient reached Maximum Medical Improvement? If yes, indicate date___] : Yes, on [unfilled] [Is there permanent partial impairment?] : Yes [Right] : right [At other employment] : At other employment [Light Work] : Light work [Could this patient perform any work activities with or without restrictions? Explain below.] : Yes [If not working, could reasonable accommodations be made to enable patient to perform work? Explain.] : Yes [FreeTextEntry7] : shoulder [FreeTextEntry8] : int rot 35 deg, ext rot 45 deg [de-identified] : full [de-identified] : measured 3 times with gonio, 15% [de-identified] : knee [de-identified] : 120 deg flex [de-identified] : full [de-identified] : lul [de-identified] : 17.5 [de-identified] : measured 3 times with gonio, 10% for flex and 7.5 % for bursitis [Could this patient perform his/her at-injury work activities with restrictions? If yes, specify below.] : No [Has the patient had an injury/illness since the date of injury which impacts residual functional capacity?] : No [de-identified] : Lumbar [de-identified] : S11.7 [de-identified] : F  20 pts (16 for mri and 4 for slr) [de-identified] : pos slr and mri

## 2023-08-28 NOTE — HISTORY OF PRESENT ILLNESS
[Dull/Aching] : dull/aching [Sharp] : sharp [Stabbing] : stabbing [Constant] : constant [Leisure] : leisure [Meds] : meds [Standing] : standing [Walking] : walking [Exercising] : exercising [Stairs] : stairs [Lying in bed] : lying in bed [Work related] : work related [9] : 9 [Sleep] : sleep [Part time] : Work status: part time [de-identified] : Pateint has persistent symptoms in the shoulder, knee and lower back. He has completed visco Mar 15 with the knee with mild help. Shoulder remains symptomatic, reaching over head, behind back. He is working. The meds do help; symptoms essentially unchanged. 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 Symptoms worse.  [de-identified] : lifting heavy objects [de-identified] : 07/11/23 [de-identified] : Dr. Avilez [de-identified] : muscle relaxer

## 2023-08-28 NOTE — PHYSICAL EXAM
[Right] : right knee [NL (140)] : flexion 140 degrees [5___] : hamstring 5[unfilled]/5 [Equivocal] : equivocal Clay [TWNoteComboBox6] : internal rotation 35 degrees [de-identified] : external rotation 45 degrees [] : no erythema [TWNoteComboBox7] : flexion 120 degrees [de-identified] : extension -5 degrees

## 2023-11-29 ENCOUNTER — APPOINTMENT (OUTPATIENT)
Dept: UROLOGY | Facility: CLINIC | Age: 58
End: 2023-11-29

## 2023-12-13 ENCOUNTER — APPOINTMENT (OUTPATIENT)
Dept: UROLOGY | Facility: CLINIC | Age: 58
End: 2023-12-13
Payer: COMMERCIAL

## 2023-12-13 DIAGNOSIS — N52.9 MALE ERECTILE DYSFUNCTION, UNSPECIFIED: ICD-10-CM

## 2023-12-13 DIAGNOSIS — Z12.5 ENCOUNTER FOR SCREENING FOR MALIGNANT NEOPLASM OF PROSTATE: ICD-10-CM

## 2023-12-13 PROCEDURE — 99214 OFFICE O/P EST MOD 30 MIN: CPT

## 2023-12-13 RX ORDER — TESTOSTERONE 20.25 MG/1.25G
20.25 MG/1.25GM GEL TOPICAL
Qty: 2 | Refills: 0 | Status: DISCONTINUED | COMMUNITY
Start: 2021-11-03 | End: 2023-12-13

## 2023-12-13 RX ORDER — TADALAFIL 10 MG/1
10 TABLET, FILM COATED ORAL
Qty: 30 | Refills: 5 | Status: ACTIVE | COMMUNITY
Start: 2021-08-10 | End: 1900-01-01

## 2023-12-13 NOTE — PHYSICAL EXAM
[Normal Appearance] : normal appearance [Well Groomed] : well groomed [Edema] : no peripheral edema [Exaggerated Use Of Accessory Muscles For Inspiration] : no accessory muscle use [Bowel Sounds] : normal bowel sounds [Abdomen Tenderness] : non-tender [Urethral Meatus] : meatus normal [Epididymis] : the epididymides were normal [Testes Tenderness] : no tenderness of the testes [Testes Mass (___cm)] : there were no testicular masses [Prostate Enlargement] : the prostate was not enlarged [Prostate Tenderness] : the prostate was not tender [No Prostate Nodules] : no prostate nodules

## 2023-12-13 NOTE — ASSESSMENT
[FreeTextEntry1] : 58 y.o. M with  #PSA screening Prostate cancer screening was discussed today in detail.  I reviewed with the patient that the PSA is a nonspecific test for prostate cancer but is specific to the prostate.  We reviewed that multiple issues related to the prostate can all cause PSA to increase including prostate enlargement, prostate cancer, and prostate inflammation.  - PSA  #ED, ?low T - In terms of his erectile dysfunction, I counseled the patient. I discussed the various etiologies of erectile dysfunction. I recommended that he obtain a testosterone level.  We also discussed lifestyle modifications, specifically that diet, exercising, and weight loss can improve erectile function - We discussed various options for treating his erectile dysfunction, specifically lifestyle medications, oral agents (PDE5i), intracavernosal injections, intraurethral suppositories, JESSICA devices, and finally IPP.  We discussed we typically start in a stepwise fashion. - I reviewed the use of oral PDE 5 inhibitors today with the patient for management of erectile dysfunction.  I have explained the proper use and potential side effects that could be experienced.  We discussed using the medication on an empty stomach to maximize absorption.  We also reviewed the potential for headaches, sinus congestion, and facial flushing which are all potential side effects of the medication.  Depending on efficacy, we also reviewed the fact that other treatment options could be considered and dosages adjusted.  - Tadalafil 10mg prescribed, can titrate to 20mg

## 2023-12-13 NOTE — HISTORY OF PRESENT ILLNESS
[FreeTextEntry1] : 58-year-old male presents for ED.  Previously followed by Dr. Osullivan  Testosterone November 2021: 592, free testosterone 11.5 Was last seen in 2021. Was prescribed testosterone in 2021 but reports he never started this  He still has problems with erections. He had some of his cialis at home and took this - this worked well. Works in a [employer] - reports his job is stressful. Thinks this contributes.  No dyspnea, chest pain.  No gross hematuria, dysuria. Reports his stream is sometimes weak. He offers no urinary complaints  PVR: 10 mL

## 2023-12-17 LAB
PSA SERPL-MCNC: 0.63 NG/ML
TESTOST SERPL-MCNC: 291 NG/DL

## 2025-01-28 ENCOUNTER — APPOINTMENT (OUTPATIENT)
Dept: UROLOGY | Facility: CLINIC | Age: 60
End: 2025-01-28

## 2025-02-18 ENCOUNTER — APPOINTMENT (OUTPATIENT)
Dept: UROLOGY | Facility: CLINIC | Age: 60
End: 2025-02-18
Payer: COMMERCIAL

## 2025-02-18 VITALS
WEIGHT: 186 LBS | OXYGEN SATURATION: 98 % | BODY MASS INDEX: 28.19 KG/M2 | HEIGHT: 68 IN | HEART RATE: 76 BPM | DIASTOLIC BLOOD PRESSURE: 84 MMHG | SYSTOLIC BLOOD PRESSURE: 135 MMHG

## 2025-02-18 DIAGNOSIS — N52.9 MALE ERECTILE DYSFUNCTION, UNSPECIFIED: ICD-10-CM

## 2025-02-18 DIAGNOSIS — Z12.5 ENCOUNTER FOR SCREENING FOR MALIGNANT NEOPLASM OF PROSTATE: ICD-10-CM

## 2025-02-18 PROCEDURE — 99214 OFFICE O/P EST MOD 30 MIN: CPT

## 2025-02-19 LAB
APPEARANCE: CLEAR
BACTERIA: NEGATIVE /HPF
BILIRUBIN URINE: NEGATIVE
BLOOD URINE: NEGATIVE
CAST: 0 /LPF
COLOR: YELLOW
EPITHELIAL CELLS: 0 /HPF
GLUCOSE QUALITATIVE U: NEGATIVE MG/DL
KETONES URINE: NEGATIVE MG/DL
LEUKOCYTE ESTERASE URINE: NEGATIVE
MICROSCOPIC-UA: NORMAL
NITRITE URINE: NEGATIVE
PH URINE: 6.5
PROTEIN URINE: NEGATIVE MG/DL
PSA SERPL-MCNC: 0.87 NG/ML
RED BLOOD CELLS URINE: 0 /HPF
SPECIFIC GRAVITY URINE: 1.02
UROBILINOGEN URINE: 1 MG/DL
WHITE BLOOD CELLS URINE: 0 /HPF

## 2025-08-19 ENCOUNTER — APPOINTMENT (OUTPATIENT)
Dept: UROLOGY | Facility: CLINIC | Age: 60
End: 2025-08-19
Payer: COMMERCIAL

## 2025-08-19 VITALS
DIASTOLIC BLOOD PRESSURE: 64 MMHG | HEART RATE: 58 BPM | SYSTOLIC BLOOD PRESSURE: 115 MMHG | OXYGEN SATURATION: 97 % | BODY MASS INDEX: 28.19 KG/M2 | WEIGHT: 186 LBS | HEIGHT: 68 IN

## 2025-08-19 DIAGNOSIS — I86.1 SCROTAL VARICES: ICD-10-CM

## 2025-08-19 DIAGNOSIS — N52.9 MALE ERECTILE DYSFUNCTION, UNSPECIFIED: ICD-10-CM

## 2025-08-19 PROCEDURE — 99213 OFFICE O/P EST LOW 20 MIN: CPT
